# Patient Record
Sex: FEMALE | Race: ASIAN | NOT HISPANIC OR LATINO
[De-identification: names, ages, dates, MRNs, and addresses within clinical notes are randomized per-mention and may not be internally consistent; named-entity substitution may affect disease eponyms.]

---

## 2018-03-28 ENCOUNTER — TRANSCRIPTION ENCOUNTER (OUTPATIENT)
Age: 63
End: 2018-03-28

## 2018-03-29 ENCOUNTER — OUTPATIENT (OUTPATIENT)
Dept: OUTPATIENT SERVICES | Facility: HOSPITAL | Age: 63
LOS: 1 days | End: 2018-03-29
Payer: MEDICAID

## 2018-03-29 VITALS
HEART RATE: 75 BPM | HEIGHT: 58 IN | SYSTOLIC BLOOD PRESSURE: 125 MMHG | DIASTOLIC BLOOD PRESSURE: 77 MMHG | TEMPERATURE: 98 F | OXYGEN SATURATION: 97 % | WEIGHT: 156.09 LBS | RESPIRATION RATE: 18 BRPM

## 2018-03-29 DIAGNOSIS — Z90.11 ACQUIRED ABSENCE OF RIGHT BREAST AND NIPPLE: Chronic | ICD-10-CM

## 2018-03-29 DIAGNOSIS — Z45.2 ENCOUNTER FOR ADJUSTMENT AND MANAGEMENT OF VASCULAR ACCESS DEVICE: ICD-10-CM

## 2018-03-29 DIAGNOSIS — Z01.818 ENCOUNTER FOR OTHER PREPROCEDURAL EXAMINATION: ICD-10-CM

## 2018-03-29 DIAGNOSIS — Z98.49 CATARACT EXTRACTION STATUS, UNSPECIFIED EYE: Chronic | ICD-10-CM

## 2018-03-29 DIAGNOSIS — Z98.41 CATARACT EXTRACTION STATUS, RIGHT EYE: Chronic | ICD-10-CM

## 2018-03-29 DIAGNOSIS — C50.911 MALIGNANT NEOPLASM OF UNSPECIFIED SITE OF RIGHT FEMALE BREAST: ICD-10-CM

## 2018-03-29 DIAGNOSIS — Z98.890 OTHER SPECIFIED POSTPROCEDURAL STATES: Chronic | ICD-10-CM

## 2018-03-29 PROCEDURE — G0463: CPT

## 2018-03-29 RX ORDER — SODIUM CHLORIDE 9 MG/ML
3 INJECTION INTRAMUSCULAR; INTRAVENOUS; SUBCUTANEOUS EVERY 8 HOURS
Qty: 0 | Refills: 0 | Status: DISCONTINUED | OUTPATIENT
Start: 2018-04-04 | End: 2018-04-12

## 2018-03-29 NOTE — H&P PST ADULT - HISTORY OF PRESENT ILLNESS
62year old female with pmhx of cataract, hypertension, hyperlipidemia, osteoporosis and right breast cancer - s/p partial radical mastectomy 2015 presents for presurgical testing for scheduled removal of bardport on 4/4/18

## 2018-03-29 NOTE — H&P PST ADULT - PMH
Breast CA, right  right mastectomy this morning  Cataract    HTN (hypertension)    Hyperlipidemia    Malignant neoplasm of right female breast, unspecified estrogen receptor status, unspecified site of breast    Osteoporosis

## 2018-03-29 NOTE — H&P PST ADULT - NEGATIVE ENMT SYMPTOMS
no nasal obstruction/no tinnitus/no vertigo/no sinus symptoms/no nasal discharge/no ear pain/no hearing difficulty/no nasal congestion

## 2018-03-29 NOTE — H&P PST ADULT - NEGATIVE SKIN SYMPTOMS
no brittle nails/no itching/no dryness/no rash/no hair loss/no change in size/color of mole/no tumor/no pitted nails

## 2018-03-29 NOTE — H&P PST ADULT - PSH
H/O cataract extraction, right    H/O unilateral modified radical mastectomy, right  5/2015  History of vascular access device  Mediport insertion for chemotherapy post right breast radical mastectomy 2015  Status post cataract extraction

## 2018-04-03 ENCOUNTER — TRANSCRIPTION ENCOUNTER (OUTPATIENT)
Age: 63
End: 2018-04-03

## 2018-04-04 ENCOUNTER — RESULT REVIEW (OUTPATIENT)
Age: 63
End: 2018-04-04

## 2018-04-04 ENCOUNTER — OUTPATIENT (OUTPATIENT)
Dept: OUTPATIENT SERVICES | Facility: HOSPITAL | Age: 63
LOS: 1 days | End: 2018-04-04
Payer: MEDICAID

## 2018-04-04 VITALS
RESPIRATION RATE: 16 BRPM | TEMPERATURE: 98 F | SYSTOLIC BLOOD PRESSURE: 139 MMHG | WEIGHT: 154.1 LBS | DIASTOLIC BLOOD PRESSURE: 74 MMHG | HEART RATE: 87 BPM | OXYGEN SATURATION: 98 %

## 2018-04-04 VITALS
SYSTOLIC BLOOD PRESSURE: 119 MMHG | TEMPERATURE: 98 F | OXYGEN SATURATION: 100 % | DIASTOLIC BLOOD PRESSURE: 61 MMHG | RESPIRATION RATE: 16 BRPM | HEART RATE: 79 BPM

## 2018-04-04 DIAGNOSIS — Z98.49 CATARACT EXTRACTION STATUS, UNSPECIFIED EYE: Chronic | ICD-10-CM

## 2018-04-04 DIAGNOSIS — Z01.818 ENCOUNTER FOR OTHER PREPROCEDURAL EXAMINATION: ICD-10-CM

## 2018-04-04 DIAGNOSIS — Z90.11 ACQUIRED ABSENCE OF RIGHT BREAST AND NIPPLE: Chronic | ICD-10-CM

## 2018-04-04 DIAGNOSIS — Z98.890 OTHER SPECIFIED POSTPROCEDURAL STATES: Chronic | ICD-10-CM

## 2018-04-04 DIAGNOSIS — C50.911 MALIGNANT NEOPLASM OF UNSPECIFIED SITE OF RIGHT FEMALE BREAST: ICD-10-CM

## 2018-04-04 DIAGNOSIS — Z45.2 ENCOUNTER FOR ADJUSTMENT AND MANAGEMENT OF VASCULAR ACCESS DEVICE: ICD-10-CM

## 2018-04-04 DIAGNOSIS — Z98.41 CATARACT EXTRACTION STATUS, RIGHT EYE: Chronic | ICD-10-CM

## 2018-04-04 PROCEDURE — 88300 SURGICAL PATH GROSS: CPT | Mod: 26

## 2018-04-04 PROCEDURE — 88300 SURGICAL PATH GROSS: CPT

## 2018-04-04 PROCEDURE — 36590 REMOVAL TUNNELED CV CATH: CPT

## 2018-04-04 RX ORDER — SODIUM CHLORIDE 9 MG/ML
1000 INJECTION, SOLUTION INTRAVENOUS
Qty: 0 | Refills: 0 | Status: DISCONTINUED | OUTPATIENT
Start: 2018-04-04 | End: 2018-04-04

## 2018-04-04 RX ORDER — HYDROMORPHONE HYDROCHLORIDE 2 MG/ML
0.5 INJECTION INTRAMUSCULAR; INTRAVENOUS; SUBCUTANEOUS
Qty: 0 | Refills: 0 | Status: DISCONTINUED | OUTPATIENT
Start: 2018-04-04 | End: 2018-04-04

## 2018-04-04 RX ORDER — ONDANSETRON 8 MG/1
4 TABLET, FILM COATED ORAL ONCE
Qty: 0 | Refills: 0 | Status: DISCONTINUED | OUTPATIENT
Start: 2018-04-04 | End: 2018-04-04

## 2018-04-04 RX ORDER — ACETAMINOPHEN WITH CODEINE 300MG-30MG
1 TABLET ORAL EVERY 4 HOURS
Qty: 0 | Refills: 0 | Status: DISCONTINUED | OUTPATIENT
Start: 2018-04-04 | End: 2018-04-04

## 2018-04-04 RX ADMIN — Medication 1 TABLET(S): at 11:35

## 2018-04-04 RX ADMIN — SODIUM CHLORIDE 3 MILLILITER(S): 9 INJECTION INTRAMUSCULAR; INTRAVENOUS; SUBCUTANEOUS at 07:57

## 2018-04-04 RX ADMIN — Medication 1 TABLET(S): at 12:05

## 2018-04-04 NOTE — BRIEF OPERATIVE NOTE - PROCEDURE
<<-----Click on this checkbox to enter Procedure Removal of central venous catheter with port from chest  04/04/2018    Active  YESSICA

## 2018-04-04 NOTE — ASU DISCHARGE PLAN (ADULT/PEDIATRIC). - MEDICATION SUMMARY - MEDICATIONS TO TAKE
I will START or STAY ON the medications listed below when I get home from the hospital:    gabapentin 300 mg oral capsule  -- 1 cap(s) by mouth 2 times a day  -- Indication: For As directed    anastrozole 1 mg oral tablet  -- 1 tab(s) by mouth once a day  -- Indication: For As directed    metoprolol tartrate 25 mg oral tablet  -- 1 tab(s) by mouth 2 times a day  -- Indication: For As directed    risedronate 35 mg oral tablet  -- 1 tab(s) by mouth once a week  -- Indication: For As directed

## 2018-04-04 NOTE — BRIEF OPERATIVE NOTE - POST-OP DX
Malignant neoplasm of right female breast, unspecified estrogen receptor status, unspecified site of breast  04/04/2018    Active  Treasure Michael

## 2018-04-09 LAB — SURGICAL PATHOLOGY FINAL REPORT - CH: SIGNIFICANT CHANGE UP

## 2020-09-02 DIAGNOSIS — K80.50 CALCULUS OF BILE DUCT W/OUT CHOLANGITIS OR CHOLECYSTITIS W/OUT OBSTRUCTION: ICD-10-CM

## 2020-09-09 ENCOUNTER — LABORATORY RESULT (OUTPATIENT)
Age: 65
End: 2020-09-09

## 2020-09-09 ENCOUNTER — APPOINTMENT (OUTPATIENT)
Dept: DISASTER EMERGENCY | Facility: CLINIC | Age: 65
End: 2020-09-09

## 2020-09-09 PROBLEM — H26.9 UNSPECIFIED CATARACT: Chronic | Status: ACTIVE | Noted: 2018-03-29

## 2020-09-09 PROBLEM — C50.911 MALIGNANT NEOPLASM OF UNSPECIFIED SITE OF RIGHT FEMALE BREAST: Chronic | Status: ACTIVE | Noted: 2018-03-29

## 2020-09-10 ENCOUNTER — RESULT REVIEW (OUTPATIENT)
Age: 65
End: 2020-09-10

## 2020-09-10 ENCOUNTER — OUTPATIENT (OUTPATIENT)
Dept: OUTPATIENT SERVICES | Facility: HOSPITAL | Age: 65
LOS: 1 days | Discharge: HOME | End: 2020-09-10
Payer: MEDICAID

## 2020-09-10 ENCOUNTER — APPOINTMENT (OUTPATIENT)
Dept: GASTROENTEROLOGY | Facility: HOSPITAL | Age: 65
End: 2020-09-10

## 2020-09-10 VITALS
WEIGHT: 143.96 LBS | TEMPERATURE: 99 F | RESPIRATION RATE: 20 BRPM | OXYGEN SATURATION: 98 % | HEIGHT: 60 IN | HEART RATE: 84 BPM | SYSTOLIC BLOOD PRESSURE: 147 MMHG | DIASTOLIC BLOOD PRESSURE: 47 MMHG

## 2020-09-10 VITALS
HEART RATE: 82 BPM | RESPIRATION RATE: 14 BRPM | OXYGEN SATURATION: 97 % | DIASTOLIC BLOOD PRESSURE: 74 MMHG | SYSTOLIC BLOOD PRESSURE: 150 MMHG

## 2020-09-10 DIAGNOSIS — Z98.49 CATARACT EXTRACTION STATUS, UNSPECIFIED EYE: Chronic | ICD-10-CM

## 2020-09-10 DIAGNOSIS — Z90.11 ACQUIRED ABSENCE OF RIGHT BREAST AND NIPPLE: Chronic | ICD-10-CM

## 2020-09-10 DIAGNOSIS — K80.50 CALCULUS OF BILE DUCT WITHOUT CHOLANGITIS OR CHOLECYSTITIS WITHOUT OBSTRUCTION: ICD-10-CM

## 2020-09-10 DIAGNOSIS — Z98.890 OTHER SPECIFIED POSTPROCEDURAL STATES: Chronic | ICD-10-CM

## 2020-09-10 DIAGNOSIS — Z98.41 CATARACT EXTRACTION STATUS, RIGHT EYE: Chronic | ICD-10-CM

## 2020-09-10 PROCEDURE — 43264 ERCP REMOVE DUCT CALCULI: CPT | Mod: 59

## 2020-09-10 PROCEDURE — 43262 ENDO CHOLANGIOPANCREATOGRAPH: CPT | Mod: 59

## 2020-09-10 PROCEDURE — 74328 X-RAY BILE DUCT ENDOSCOPY: CPT | Mod: 26

## 2020-09-10 PROCEDURE — 43254 EGD ENDO MUCOSAL RESECTION: CPT | Mod: 59

## 2020-09-10 PROCEDURE — 43277 ERCP EA DUCT/AMPULLA DILATE: CPT | Mod: 59

## 2020-09-10 PROCEDURE — 88305 TISSUE EXAM BY PATHOLOGIST: CPT | Mod: 26

## 2020-09-11 LAB — SURGICAL PATHOLOGY STUDY: SIGNIFICANT CHANGE UP

## 2020-09-23 DIAGNOSIS — K31.89 OTHER DISEASES OF STOMACH AND DUODENUM: ICD-10-CM

## 2020-11-30 ENCOUNTER — NON-APPOINTMENT (OUTPATIENT)
Age: 65
End: 2020-11-30

## 2021-02-24 DIAGNOSIS — Z01.812 ENCOUNTER FOR PREPROCEDURAL LABORATORY EXAMINATION: ICD-10-CM

## 2021-02-25 DIAGNOSIS — Z01.818 ENCOUNTER FOR OTHER PREPROCEDURAL EXAMINATION: ICD-10-CM

## 2021-02-26 ENCOUNTER — OUTPATIENT (OUTPATIENT)
Dept: OUTPATIENT SERVICES | Facility: HOSPITAL | Age: 66
LOS: 1 days | End: 2021-02-26
Payer: MEDICAID

## 2021-02-26 ENCOUNTER — APPOINTMENT (OUTPATIENT)
Dept: DISASTER EMERGENCY | Facility: CLINIC | Age: 66
End: 2021-02-26

## 2021-02-26 VITALS
HEART RATE: 82 BPM | DIASTOLIC BLOOD PRESSURE: 85 MMHG | OXYGEN SATURATION: 95 % | SYSTOLIC BLOOD PRESSURE: 140 MMHG | TEMPERATURE: 97 F | RESPIRATION RATE: 16 BRPM | HEIGHT: 58 IN | WEIGHT: 151.9 LBS

## 2021-02-26 DIAGNOSIS — Z90.11 ACQUIRED ABSENCE OF RIGHT BREAST AND NIPPLE: Chronic | ICD-10-CM

## 2021-02-26 DIAGNOSIS — Z98.890 OTHER SPECIFIED POSTPROCEDURAL STATES: Chronic | ICD-10-CM

## 2021-02-26 DIAGNOSIS — Z98.49 CATARACT EXTRACTION STATUS, UNSPECIFIED EYE: Chronic | ICD-10-CM

## 2021-02-26 DIAGNOSIS — Z98.41 CATARACT EXTRACTION STATUS, RIGHT EYE: Chronic | ICD-10-CM

## 2021-02-26 DIAGNOSIS — K31.89 OTHER DISEASES OF STOMACH AND DUODENUM: ICD-10-CM

## 2021-02-26 LAB
ALBUMIN SERPL ELPH-MCNC: 3.4 G/DL — SIGNIFICANT CHANGE UP (ref 3.3–5)
ALP SERPL-CCNC: 138 U/L — HIGH (ref 40–120)
ALT FLD-CCNC: 14 U/L — SIGNIFICANT CHANGE UP (ref 4–33)
ANION GAP SERPL CALC-SCNC: 8 MMOL/L — SIGNIFICANT CHANGE UP (ref 7–14)
AST SERPL-CCNC: 16 U/L — SIGNIFICANT CHANGE UP (ref 4–32)
BILIRUB SERPL-MCNC: 0.3 MG/DL — SIGNIFICANT CHANGE UP (ref 0.2–1.2)
BUN SERPL-MCNC: 10 MG/DL — SIGNIFICANT CHANGE UP (ref 7–23)
CALCIUM SERPL-MCNC: 9.3 MG/DL — SIGNIFICANT CHANGE UP (ref 8.4–10.5)
CHLORIDE SERPL-SCNC: 104 MMOL/L — SIGNIFICANT CHANGE UP (ref 98–107)
CO2 SERPL-SCNC: 26 MMOL/L — SIGNIFICANT CHANGE UP (ref 22–31)
CREAT SERPL-MCNC: 0.73 MG/DL — SIGNIFICANT CHANGE UP (ref 0.5–1.3)
GLUCOSE SERPL-MCNC: 88 MG/DL — SIGNIFICANT CHANGE UP (ref 70–99)
HCT VFR BLD CALC: 38.9 % — SIGNIFICANT CHANGE UP (ref 34.5–45)
HGB BLD-MCNC: 12 G/DL — SIGNIFICANT CHANGE UP (ref 11.5–15.5)
MCHC RBC-ENTMCNC: 28.5 PG — SIGNIFICANT CHANGE UP (ref 27–34)
MCHC RBC-ENTMCNC: 30.8 GM/DL — LOW (ref 32–36)
MCV RBC AUTO: 92.4 FL — SIGNIFICANT CHANGE UP (ref 80–100)
NRBC # BLD: 0 /100 WBCS — SIGNIFICANT CHANGE UP
NRBC # FLD: 0 K/UL — SIGNIFICANT CHANGE UP
PLATELET # BLD AUTO: 332 K/UL — SIGNIFICANT CHANGE UP (ref 150–400)
POTASSIUM SERPL-MCNC: 4.4 MMOL/L — SIGNIFICANT CHANGE UP (ref 3.5–5.3)
POTASSIUM SERPL-SCNC: 4.4 MMOL/L — SIGNIFICANT CHANGE UP (ref 3.5–5.3)
PROT SERPL-MCNC: 8.7 G/DL — HIGH (ref 6–8.3)
RBC # BLD: 4.21 M/UL — SIGNIFICANT CHANGE UP (ref 3.8–5.2)
RBC # FLD: 13.8 % — SIGNIFICANT CHANGE UP (ref 10.3–14.5)
SODIUM SERPL-SCNC: 138 MMOL/L — SIGNIFICANT CHANGE UP (ref 135–145)
WBC # BLD: 8.59 K/UL — SIGNIFICANT CHANGE UP (ref 3.8–10.5)
WBC # FLD AUTO: 8.59 K/UL — SIGNIFICANT CHANGE UP (ref 3.8–10.5)

## 2021-02-26 PROCEDURE — 93010 ELECTROCARDIOGRAM REPORT: CPT

## 2021-02-26 RX ORDER — RISEDRONATE SODIUM 25.8; 4.2 MG/1; MG/1
1 TABLET, FILM COATED ORAL
Qty: 0 | Refills: 0 | DISCHARGE

## 2021-02-26 RX ORDER — METOPROLOL TARTRATE 50 MG
1 TABLET ORAL
Qty: 0 | Refills: 0 | DISCHARGE

## 2021-02-26 RX ORDER — GABAPENTIN 400 MG/1
1 CAPSULE ORAL
Qty: 0 | Refills: 0 | DISCHARGE

## 2021-02-26 RX ORDER — ANASTROZOLE 1 MG/1
1 TABLET ORAL
Qty: 0 | Refills: 0 | DISCHARGE

## 2021-02-26 NOTE — H&P PST ADULT - NSICDXPASTSURGICALHX_GEN_ALL_CORE_FT
PAST SURGICAL HISTORY:  H/O cataract extraction, right     H/O unilateral modified radical mastectomy, right 5/2015    History of vascular access device Mediport insertion for chemotherapy post right breast radical mastectomy 2015, removed 2018    S/P ERCP 9/2020    Status post cataract extraction

## 2021-02-26 NOTE — H&P PST ADULT - NSICDXPASTMEDICALHX_GEN_ALL_CORE_FT
PAST MEDICAL HISTORY:  Breast CA, right right mastectomy 2015 chemotherapy    Cataract     Choledocholithiasis     Duodenal adenoma     HTN (hypertension)     Hyperlipidemia     Malignant neoplasm of right female breast, unspecified estrogen receptor status, unspecified site of breast     Osteoporosis

## 2021-02-26 NOTE — H&P PST ADULT - HISTORY OF PRESENT ILLNESS
65 year old female PHX HTN HLD osteoporosis breast cancer presents to presurgical testing with diagnosis of other diseases of stomach and duodenum scheduled for endoscopy. 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 Pt to have a follow up endoscopy.     Pt was exposed to COVID, had a rapid COVID test on 2/24 which was negative.

## 2021-02-26 NOTE — H&P PST ADULT - ASSESSMENT
Problem: other diseases of stomach and duodenum  Assessment and Plan: Pt is tentatively scheduled for endoscopy for 3/1/21. Pre-op instructions provided to patient and daughter. Pt and daughter given verbal and written instructions with teach back on pepcid. Pt and daughter verbalized understanding with return demonstration.   REJI precautions, OR booking notified     Problem: HTN  Assessment and Plan: Patient instructed to take metoprolol with a sip of water on the morning of procedure.     Problem: pain  Assessment and Plan: Patient instructed to take gabapentin with a sip of water on the morning of procedure.     COVID test scheduled for today  Case discussed with Dr Elizalde.

## 2021-02-26 NOTE — H&P PST ADULT - SOURCE OF INFORMATION, PROFILE
cristina. Pt is Tagalog speaking, offered hospital  but patient declined and preferred to use daughter Kendall for interpretation via phone/patient

## 2021-02-26 NOTE — H&P PST ADULT - NEGATIVE ENMT SYMPTOMS
no hearing difficulty/no ear pain/no tinnitus/no vertigo/no sinus symptoms/no nasal congestion/no nasal discharge/no nasal obstruction/no nose bleeds/no throat pain/no dysphagia

## 2021-02-27 LAB — SARS-COV-2 N GENE NPH QL NAA+PROBE: DETECTED

## 2021-03-11 PROBLEM — D13.2 BENIGN NEOPLASM OF DUODENUM: Chronic | Status: ACTIVE | Noted: 2021-02-26

## 2021-03-11 PROBLEM — K80.50 CALCULUS OF BILE DUCT WITHOUT CHOLANGITIS OR CHOLECYSTITIS WITHOUT OBSTRUCTION: Chronic | Status: ACTIVE | Noted: 2021-02-26

## 2021-03-16 ENCOUNTER — TRANSCRIPTION ENCOUNTER (OUTPATIENT)
Age: 66
End: 2021-03-16

## 2021-03-28 RX ORDER — SODIUM CHLORIDE 9 MG/ML
500 INJECTION INTRAMUSCULAR; INTRAVENOUS; SUBCUTANEOUS
Refills: 0 | Status: DISCONTINUED | OUTPATIENT
Start: 2021-11-15 | End: 2021-11-29

## 2021-03-29 ENCOUNTER — APPOINTMENT (OUTPATIENT)
Dept: GASTROENTEROLOGY | Facility: HOSPITAL | Age: 66
End: 2021-03-29

## 2021-11-03 ENCOUNTER — NON-APPOINTMENT (OUTPATIENT)
Age: 66
End: 2021-11-03

## 2021-11-12 ENCOUNTER — TRANSCRIPTION ENCOUNTER (OUTPATIENT)
Age: 66
End: 2021-11-12

## 2021-11-15 ENCOUNTER — OUTPATIENT (OUTPATIENT)
Dept: OUTPATIENT SERVICES | Facility: HOSPITAL | Age: 66
LOS: 1 days | Discharge: HOME | End: 2021-11-15
Payer: MEDICAID

## 2021-11-15 ENCOUNTER — APPOINTMENT (OUTPATIENT)
Dept: GASTROENTEROLOGY | Facility: HOSPITAL | Age: 66
End: 2021-11-15

## 2021-11-15 ENCOUNTER — RESULT REVIEW (OUTPATIENT)
Age: 66
End: 2021-11-15

## 2021-11-15 VITALS
DIASTOLIC BLOOD PRESSURE: 78 MMHG | RESPIRATION RATE: 17 BRPM | SYSTOLIC BLOOD PRESSURE: 144 MMHG | OXYGEN SATURATION: 100 % | HEART RATE: 79 BPM

## 2021-11-15 VITALS
OXYGEN SATURATION: 98 % | WEIGHT: 151.9 LBS | SYSTOLIC BLOOD PRESSURE: 159 MMHG | HEART RATE: 89 BPM | HEIGHT: 60 IN | DIASTOLIC BLOOD PRESSURE: 81 MMHG | TEMPERATURE: 97 F | RESPIRATION RATE: 18 BRPM

## 2021-11-15 DIAGNOSIS — Z98.890 OTHER SPECIFIED POSTPROCEDURAL STATES: Chronic | ICD-10-CM

## 2021-11-15 DIAGNOSIS — Z90.11 ACQUIRED ABSENCE OF RIGHT BREAST AND NIPPLE: Chronic | ICD-10-CM

## 2021-11-15 DIAGNOSIS — K31.89 OTHER DISEASES OF STOMACH AND DUODENUM: ICD-10-CM

## 2021-11-15 DIAGNOSIS — Z98.49 CATARACT EXTRACTION STATUS, UNSPECIFIED EYE: Chronic | ICD-10-CM

## 2021-11-15 DIAGNOSIS — Z98.41 CATARACT EXTRACTION STATUS, RIGHT EYE: Chronic | ICD-10-CM

## 2021-11-15 PROCEDURE — 43235 EGD DIAGNOSTIC BRUSH WASH: CPT

## 2021-11-15 PROCEDURE — 88305 TISSUE EXAM BY PATHOLOGIST: CPT | Mod: 26

## 2021-11-15 NOTE — ASU PREOP CHECKLIST - TYPE OF SOLUTION
Dear Alejandra,    Your recent test results are attached.      No anemia.    If you have any questions please feel free to contact [phone] or myself via [employer].    Sincerely,  Sushila Remy, CNP  
Dear Alejandra,    Your recent test results are attached.      Stable kidney function.  Normal potassium.    If you have any questions please feel free to contact [phone] or myself via [employer].    Sincerely,  Sushila Remy, CNP  
LR

## 2021-11-16 LAB — SURGICAL PATHOLOGY STUDY: SIGNIFICANT CHANGE UP

## 2022-06-15 NOTE — H&P PST ADULT - ADMIT DATE
Minoxidil Counseling: Minoxidil is a topical medication which can increase blood flow where it is applied. It is uncertain how this medication increases hair growth. Side effects are uncommon and include stinging and allergic reactions. 26-Feb-2021

## 2022-10-11 NOTE — ASU PATIENT PROFILE, ADULT - URINARY CATHETER
[id number]    VS: stable, on room air.    BG: none  Labs: pending collection  Pain/Nausea: Denies.    Diet: Regular  LDA: no IV access  GI: small soft stools tonight.  Incontinent X 1.    : Voiding small amounts ~ 100cc each time.  Also incontinent X 1.  Last bladder scan over 700cc.  Attempted to straight cath pt again but unsuccessful.  Gold provider notified.  Pt in no distress.  Provider plans to call urology in am.    Skin: mepilex on coccyx.    Mobility: UP with 1 assist and GB/walker.  Plan: Transportation set up for 10:30 to assisted living today. Need to  figure out plan for urinary retention before discharge.                             no